# Patient Record
(demographics unavailable — no encounter records)

---

## 2025-05-09 NOTE — HISTORY OF PRESENT ILLNESS
[de-identified] : Pt is a 44 year old man who reports a 3-4 year h/o severe HA's. He also reports his has an " irregular heart rhythm" though does not know what it is. Medication does not resolve his HA and he receives frequent Botox injections. 1 month ago pt was in [place] for work and went for a run. He developed blurry vision in his right eye lasting 2-3 hours, did not go to receive medical evaluation. Upon return home he did see an ophthalmologist who said his exam was WNL. He was due for his Botox injections and spoke his Neurologist, Dr. Lakhani about the incident. Imaging was ordered that revealed:    4/27/25: MRA Carotids IMPRESSION: - Approximately 65% stenosis involving the proximal 8 mm of the right internal carotid  artery. - Minimal less than 40% stenosis involving the proximal left internal carotid artery.   Care Team: PCP: Cardiologist: in [place]____  Neurologist: Dr. Lakhani [address] [place] [address]  14 mi [phone]

## 2025-05-09 NOTE — HISTORY OF PRESENT ILLNESS
[de-identified] : Pt is a 44 year old man who reports a 3-4 year h/o severe HA's. He also reports his has an " irregular heart rhythm" though does not know what it is. Medication does not resolve his HA and he receives frequent Botox injections. 1 month ago pt was in [place] for work and went for a run. He developed blurry vision in his right eye lasting 2-3 hours, did not go to receive medical evaluation. Upon return home he did see an ophthalmologist who said his exam was WNL. He was due for his Botox injections and spoke his Neurologist, Dr. Lakhani about the incident. Imaging was ordered that revealed:    4/27/25: MRA Carotids IMPRESSION: - Approximately 65% stenosis involving the proximal 8 mm of the right internal carotid  artery. - Minimal less than 40% stenosis involving the proximal left internal carotid artery.   Care Team: PCP: Cardiologist: in [place]____  Neurologist: Dr. Lakhani [address] [place] [address]  14 mi [phone]

## 2025-05-09 NOTE — ASSESSMENT
[FreeTextEntry1] : I, Dr. Guidry, personally performed the evaluation and management (E/M) services for this new patient who presents today with (a) new problem. That E/M includes conducting the examination, assessing all new/exacerbated conditions, and establishing a new plan of care. Today, my ACP, Apoorva Warner, was here to observe my evaluation and management services for this new problem/exacerbated condition to be followed going forward.  PLAN: -Concern for active TIA. pt brought to [facility] ED. WIll obtain ECHO and CTA

## 2025-05-09 NOTE — ASSESSMENT
[FreeTextEntry1] : I, Dr. Guidry, personally performed the evaluation and management (E/M) services for this new patient who presents today with (a) new problem. That E/M includes conducting the examination, assessing all new/exacerbated conditions, and establishing a new plan of care. Today, my ACP, Apoorva Warner, was here to observe my evaluation and management services for this new problem/exacerbated condition to be followed going forward.  PLAN: -Concern for active TIA. pt brought to [facility] ED. WIll obtain ECHO and CTA

## 2025-05-09 NOTE — REASON FOR VISIT
[New Patient Visit] : a new patient visit [Referred By: _________] : Patient was referred by [facility] [FreeTextEntry1] : carotid stenosis

## 2025-05-09 NOTE — PHYSICAL EXAM
[General Appearance - Alert] : alert [General Appearance - In No Acute Distress] : in no acute distress [Oriented To Time, Place, And Person] : oriented to person, place, and time [Impaired Insight] : insight and judgment were intact [Affect] : the affect was normal [Person] : oriented to person [Place] : oriented to place [Time] : oriented to time [Short Term Intact] : short term memory intact [Remote Intact] : remote memory intact [Span Intact] : the attention span was normal [Concentration Intact] : normal concentrating ability [Fluency] : fluency intact [Comprehension] : comprehension intact [Current Events] : adequate knowledge of current events [Past History] : adequate knowledge of personal past history [Vocabulary] : adequate range of vocabulary [Cranial Nerves Optic (II)] : visual acuity intact bilaterally,  pupils equal round and reactive to light [Cranial Nerves Oculomotor (III)] : extraocular motion intact [Cranial Nerves Trigeminal (V)] : facial sensation intact symmetrically [Cranial Nerves Facial (VII)] : face symmetrical [Cranial Nerves Vestibulocochlear (VIII)] : hearing was intact bilaterally [Cranial Nerves Accessory (XI - Cranial And Spinal)] : head turning and shoulder shrug symmetric [Cranial Nerves Hypoglossal (XII)] : there was no tongue deviation with protrusion [Motor Tone] : muscle tone was normal in all four extremities [Motor Strength] : muscle strength was normal in all four extremities [Involuntary Movements] : no involuntary movements were seen [No Muscle Atrophy] : normal bulk in all four extremities [Sensation Tactile Decrease] : light touch was intact [Abnormal Walk] : normal gait [Balance] : balance was intact [Past-pointing] : there was no past-pointing [Tremor] : no tremor present